# Patient Record
Sex: MALE | Race: OTHER | Employment: FULL TIME | URBAN - METROPOLITAN AREA
[De-identification: names, ages, dates, MRNs, and addresses within clinical notes are randomized per-mention and may not be internally consistent; named-entity substitution may affect disease eponyms.]

---

## 2020-12-10 RX ORDER — AMLODIPINE BESYLATE 10 MG/1
TABLET ORAL
Qty: 90 TAB | Refills: 0 | Status: SHIPPED | OUTPATIENT
Start: 2020-12-10 | End: 2021-05-07

## 2021-01-22 VITALS
WEIGHT: 205.38 LBS | DIASTOLIC BLOOD PRESSURE: 75 MMHG | HEIGHT: 69 IN | RESPIRATION RATE: 18 BRPM | SYSTOLIC BLOOD PRESSURE: 120 MMHG | BODY MASS INDEX: 30.42 KG/M2 | HEART RATE: 78 BPM | OXYGEN SATURATION: 98 % | TEMPERATURE: 98.7 F

## 2021-01-22 PROBLEM — I10 ESSENTIAL HYPERTENSION: Status: ACTIVE | Noted: 2021-01-22

## 2021-01-22 PROBLEM — E78.1 HYPERTRIGLYCERIDEMIA: Status: ACTIVE | Noted: 2021-01-22

## 2021-01-22 PROBLEM — E55.9 VITAMIN D DEFICIENCY: Status: ACTIVE | Noted: 2021-01-22

## 2021-01-22 PROBLEM — G47.33 OBSTRUCTIVE SLEEP APNEA SYNDROME: Status: ACTIVE | Noted: 2021-01-22

## 2021-01-22 PROBLEM — E84.8 ELEVATED LIVER ENZYMES LEVEL DUE TO CYSTIC FIBROSIS (HCC): Status: ACTIVE | Noted: 2021-01-22

## 2021-01-22 PROBLEM — R74.8 ELEVATED LIVER ENZYMES LEVEL DUE TO CYSTIC FIBROSIS (HCC): Status: ACTIVE | Noted: 2021-01-22

## 2021-01-22 PROBLEM — N52.9 PRIMARY ERECTILE DYSFUNCTION: Status: ACTIVE | Noted: 2021-01-22

## 2021-01-22 PROBLEM — R53.83 FATIGUE: Status: ACTIVE | Noted: 2021-01-22

## 2021-01-22 PROBLEM — R68.82 REDUCED LIBIDO: Status: ACTIVE | Noted: 2021-01-22

## 2021-01-22 RX ORDER — ATORVASTATIN CALCIUM 40 MG/1
TABLET, FILM COATED ORAL DAILY
COMMUNITY
End: 2021-05-14 | Stop reason: SINTOL

## 2021-01-22 RX ORDER — CHOLECALCIFEROL (VITAMIN D3) 125 MCG
CAPSULE ORAL
COMMUNITY
End: 2021-05-14 | Stop reason: ALTCHOICE

## 2021-01-22 RX ORDER — FENOFIBRATE 145 MG/1
TABLET, COATED ORAL DAILY
COMMUNITY
End: 2021-05-14 | Stop reason: SDUPTHER

## 2021-04-16 ENCOUNTER — OFFICE VISIT (OUTPATIENT)
Dept: FAMILY MEDICINE CLINIC | Age: 56
End: 2021-04-16
Payer: COMMERCIAL

## 2021-04-16 VITALS
HEART RATE: 79 BPM | HEIGHT: 69 IN | DIASTOLIC BLOOD PRESSURE: 66 MMHG | BODY MASS INDEX: 29.77 KG/M2 | SYSTOLIC BLOOD PRESSURE: 121 MMHG | OXYGEN SATURATION: 96 % | WEIGHT: 201 LBS | TEMPERATURE: 98.6 F | RESPIRATION RATE: 18 BRPM

## 2021-04-16 DIAGNOSIS — Z11.59 ENCOUNTER FOR HEPATITIS C SCREENING TEST FOR LOW RISK PATIENT: ICD-10-CM

## 2021-04-16 DIAGNOSIS — E78.5 HYPERLIPIDEMIA, UNSPECIFIED HYPERLIPIDEMIA TYPE: ICD-10-CM

## 2021-04-16 DIAGNOSIS — Z00.00 ANNUAL PHYSICAL EXAM: ICD-10-CM

## 2021-04-16 DIAGNOSIS — Z12.11 COLON CANCER SCREENING: Primary | ICD-10-CM

## 2021-04-16 DIAGNOSIS — M77.8 TENDINITIS OF BOTH ELBOWS: ICD-10-CM

## 2021-04-16 DIAGNOSIS — M17.10 ARTHRITIS OF KNEE: ICD-10-CM

## 2021-04-16 DIAGNOSIS — I10 HYPERTENSION, ESSENTIAL: ICD-10-CM

## 2021-04-16 PROCEDURE — 99396 PREV VISIT EST AGE 40-64: CPT | Performed by: NURSE PRACTITIONER

## 2021-04-16 RX ORDER — DICLOFENAC SODIUM 75 MG/1
75 TABLET, DELAYED RELEASE ORAL 2 TIMES DAILY
Qty: 60 TAB | Refills: 0 | Status: SHIPPED | OUTPATIENT
Start: 2021-04-16 | End: 2021-05-14 | Stop reason: SDUPTHER

## 2021-04-16 NOTE — PROGRESS NOTES
Porter Gruber (: 1965) is a 54 y.o. male, established patient, here for evaluation of the following chief complaint(s):  Elbow Pain, Knee Pain, and Complete Physical       ASSESSMENT/PLAN:  1. Colon cancer screening  -     REFERRAL TO GASTROENTEROLOGY  2. Encounter for hepatitis C screening test for low risk patient  -     HEPATITIS C QT BY PCR WITH REFLEX GENOTYPE  3. Annual physical exam  -     CBC WITH AUTOMATED DIFF  -     METABOLIC PANEL, COMPREHENSIVE  -     LIPID PANEL  -     THYROID CASCADE PROFILE  -     PSA W/ REFLX FREE PSA  4. Hyperlipidemia, unspecified hyperlipidemia type  -     CBC WITH AUTOMATED DIFF  -     METABOLIC PANEL, COMPREHENSIVE  -     LIPID PANEL  -     THYROID CASCADE PROFILE  5. Hypertension, essential  -     CBC WITH AUTOMATED DIFF  -     METABOLIC PANEL, COMPREHENSIVE  -     LIPID PANEL  -     THYROID CASCADE PROFILE  -     MICROALBUMIN, UR, RAND W/ MICROALB/CREAT RATIO  6. Tendinitis of both elbows  -     diclofenac EC (VOLTAREN) 75 mg EC tablet; Take 1 Tab by mouth two (2) times a day. Indications: joint damage causing pain and loss of function, Normal, Disp-60 Tab, R-0  7. Arthritis of knee  -     diclofenac EC (VOLTAREN) 75 mg EC tablet; Take 1 Tab by mouth two (2) times a day. Indications: joint damage causing pain and loss of function, Normal, Disp-60 Tab, R-0  At this time patient due for labs to further evaluate his chronic conditions    For the elbow and the knees we will start with Voltaren orally twice a day as needed for pain    We will not do any other changes until his follow-up    Return in about 4 weeks (around 2021) for Lab review, Hyperlipidemia, HTN, knee pain, elbow arleth n diclofenac better? .      SUBJECTIVE/OBJECTIVE:  HPI  Patient presenting for hypertension followup, annual exam, hyperlipidemia check.  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 Patient has symptoms of none of the following; no chest pain, shortness of breath, weakness, orthostatic hypotension, cough, myalgias, rash, headaches, weight gain, leg swelling, palpitations, slow heart rate, fatigue, depression. Patient reports good compliance with medications, no side effects from medications noted. Current treatments include diet modification, weight loss. Review of Systems   All other systems reviewed and are negative. Visit Vitals  /66 (BP 1 Location: Left arm, BP Patient Position: Sitting, BP Cuff Size: Adult)   Pulse 79   Temp 98.6 °F (37 °C) (Temporal)   Resp 18   Ht 5' 9\" (1.753 m)   Wt 201 lb (91.2 kg)   SpO2 96%   BMI 29.68 kg/m²         Physical Exam  Constitutional:  No acute distress  HEENT:  Head normocephalic and atraumatic. CV:  Regular rate and rhythm. No murmur. Respiratory:  Lungs clear to auscultation bilaterally  Abdomen:  Soft, non-tender. Skin:  Normal color. Warm and Dry  Extremities:  Non-tender. No pedal edema. Back:  No tenderness  Neuro:  No gross motor deficits      An electronic signature was used to authenticate this note.   -- Yanique Alvarez NP

## 2021-04-16 NOTE — PROGRESS NOTES
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 Have you been to the ER, urgent care clinic since your last visit? Hospitalized since your last visit? No    2. Have you seen or consulted any other health care providers outside of the [address] [place] [address] since your last visit? Include any pap smears or colon screening.  No     Visit Vitals  /66 (BP 1 Location: Left arm, BP Patient Position: Sitting, BP Cuff Size: Adult)   Pulse 79   Temp 98.6 °F (37 °C) (Temporal)   Resp 18   Ht 5' 9\" (1.753 m)   Wt 201 lb (91.2 kg)   SpO2 96%   BMI 29.68 kg/m²

## 2021-04-19 LAB
ALBUMIN SERPL-MCNC: 4.4 G/DL (ref 3.8–4.9)
ALBUMIN/CREAT UR: 5 MG/G CREAT (ref 0–29)
ALBUMIN/GLOB SERPL: 1.7 {RATIO} (ref 1.2–2.2)
ALP SERPL-CCNC: 111 IU/L (ref 39–117)
ALT SERPL-CCNC: 31 IU/L (ref 0–44)
AST SERPL-CCNC: 24 IU/L (ref 0–40)
BASOPHILS # BLD AUTO: 0.1 X10E3/UL (ref 0–0.2)
BASOPHILS NFR BLD AUTO: 1 %
BILIRUB SERPL-MCNC: 0.4 MG/DL (ref 0–1.2)
BUN SERPL-MCNC: 12 MG/DL (ref 6–24)
BUN/CREAT SERPL: 16 (ref 9–20)
CALCIUM SERPL-MCNC: 8.9 MG/DL (ref 8.7–10.2)
CHLORIDE SERPL-SCNC: 105 MMOL/L (ref 96–106)
CHOLEST SERPL-MCNC: 236 MG/DL (ref 100–199)
CO2 SERPL-SCNC: 21 MMOL/L (ref 20–29)
CREAT SERPL-MCNC: 0.76 MG/DL (ref 0.76–1.27)
CREAT UR-MCNC: 164.9 MG/DL
EOSINOPHIL # BLD AUTO: 0.1 X10E3/UL (ref 0–0.4)
EOSINOPHIL NFR BLD AUTO: 1 %
ERYTHROCYTE [DISTWIDTH] IN BLOOD BY AUTOMATED COUNT: 13.4 % (ref 11.6–15.4)
GLOBULIN SER CALC-MCNC: 2.6 G/DL (ref 1.5–4.5)
GLUCOSE SERPL-MCNC: 94 MG/DL (ref 65–99)
HCT VFR BLD AUTO: 44.2 % (ref 37.5–51)
HCV GENOTYPE: NORMAL
HCV RNA SERPL NAA+PROBE-ACNC: NORMAL IU/ML
HCV RNA SERPL NAA+PROBE-LOG IU: NORMAL LOG10 IU/ML
HDLC SERPL-MCNC: 41 MG/DL
HGB BLD-MCNC: 14.9 G/DL (ref 13–17.7)
IMM GRANULOCYTES # BLD AUTO: 0 X10E3/UL (ref 0–0.1)
IMM GRANULOCYTES NFR BLD AUTO: 0 %
LDLC SERPL CALC-MCNC: 151 MG/DL (ref 0–99)
LYMPHOCYTES # BLD AUTO: 2.1 X10E3/UL (ref 0.7–3.1)
LYMPHOCYTES NFR BLD AUTO: 33 %
MCH RBC QN AUTO: 30.7 PG (ref 26.6–33)
MCHC RBC AUTO-ENTMCNC: 33.7 G/DL (ref 31.5–35.7)
MCV RBC AUTO: 91 FL (ref 79–97)
MICROALBUMIN UR-MCNC: 8.7 UG/ML
MONOCYTES # BLD AUTO: 0.5 X10E3/UL (ref 0.1–0.9)
MONOCYTES NFR BLD AUTO: 8 %
NEUTROPHILS # BLD AUTO: 3.5 X10E3/UL (ref 1.4–7)
NEUTROPHILS NFR BLD AUTO: 57 %
PLATELET # BLD AUTO: 307 X10E3/UL (ref 150–450)
POTASSIUM SERPL-SCNC: 4 MMOL/L (ref 3.5–5.2)
PROT SERPL-MCNC: 7 G/DL (ref 6–8.5)
PSA SERPL-MCNC: 1 NG/ML (ref 0–4)
RBC # BLD AUTO: 4.86 X10E6/UL (ref 4.14–5.8)
REFLEX CRITERIA: NORMAL
SODIUM SERPL-SCNC: 142 MMOL/L (ref 134–144)
TEST INFORMATION: NORMAL
TRIGL SERPL-MCNC: 237 MG/DL (ref 0–149)
TSH SERPL DL<=0.005 MIU/L-ACNC: 0.94 UIU/ML (ref 0.45–4.5)
VLDLC SERPL CALC-MCNC: 44 MG/DL (ref 5–40)
WBC # BLD AUTO: 6.2 X10E3/UL (ref 3.4–10.8)

## 2021-04-27 ENCOUNTER — TELEPHONE (OUTPATIENT)
Dept: FAMILY MEDICINE CLINIC | Age: 56
End: 2021-04-27

## 2021-05-07 RX ORDER — AMLODIPINE BESYLATE 10 MG/1
TABLET ORAL
Qty: 90 TAB | Refills: 6 | Status: SHIPPED | OUTPATIENT
Start: 2021-05-07 | End: 2021-05-14 | Stop reason: SDUPTHER

## 2021-05-14 ENCOUNTER — OFFICE VISIT (OUTPATIENT)
Dept: FAMILY MEDICINE CLINIC | Age: 56
End: 2021-05-14
Payer: COMMERCIAL

## 2021-05-14 VITALS
HEART RATE: 77 BPM | DIASTOLIC BLOOD PRESSURE: 86 MMHG | RESPIRATION RATE: 16 BRPM | TEMPERATURE: 98.7 F | BODY MASS INDEX: 29.33 KG/M2 | HEIGHT: 69 IN | SYSTOLIC BLOOD PRESSURE: 131 MMHG | WEIGHT: 198 LBS | OXYGEN SATURATION: 98 %

## 2021-05-14 DIAGNOSIS — R53.82 CHRONIC FATIGUE: ICD-10-CM

## 2021-05-14 DIAGNOSIS — M17.10 ARTHRITIS OF KNEE: ICD-10-CM

## 2021-05-14 DIAGNOSIS — I10 ESSENTIAL HYPERTENSION: ICD-10-CM

## 2021-05-14 DIAGNOSIS — M77.8 TENDINITIS OF BOTH ELBOWS: ICD-10-CM

## 2021-05-14 DIAGNOSIS — E78.1 HYPERTRIGLYCERIDEMIA: Primary | ICD-10-CM

## 2021-05-14 DIAGNOSIS — R68.82 REDUCED LIBIDO: ICD-10-CM

## 2021-05-14 DIAGNOSIS — E55.9 VITAMIN D DEFICIENCY: ICD-10-CM

## 2021-05-14 DIAGNOSIS — E78.00 PURE HYPERCHOLESTEROLEMIA: ICD-10-CM

## 2021-05-14 PROCEDURE — 99214 OFFICE O/P EST MOD 30 MIN: CPT | Performed by: NURSE PRACTITIONER

## 2021-05-14 RX ORDER — TADALAFIL 5 MG/1
5 TABLET ORAL
Qty: 30 TAB | Refills: 0 | Status: SHIPPED | OUTPATIENT
Start: 2021-05-14 | End: 2021-08-20 | Stop reason: SDUPTHER

## 2021-05-14 RX ORDER — EZETIMIBE 10 MG/1
10 TABLET ORAL DAILY
Qty: 90 TAB | Refills: 0 | Status: SHIPPED
Start: 2021-05-14 | End: 2021-08-20 | Stop reason: SINTOL

## 2021-05-14 RX ORDER — FENOFIBRATE 145 MG/1
145 TABLET, COATED ORAL
Qty: 90 TAB | Refills: 0 | Status: SHIPPED
Start: 2021-05-14 | End: 2021-08-20 | Stop reason: SINTOL

## 2021-05-14 RX ORDER — AMLODIPINE BESYLATE 10 MG/1
TABLET ORAL
Qty: 90 TAB | Refills: 6 | Status: SHIPPED | OUTPATIENT
Start: 2021-05-14 | End: 2021-08-20 | Stop reason: SDUPTHER

## 2021-05-14 RX ORDER — DICLOFENAC SODIUM 75 MG/1
75 TABLET, DELAYED RELEASE ORAL 2 TIMES DAILY
Qty: 60 TAB | Refills: 0 | Status: SHIPPED | OUTPATIENT
Start: 2021-05-14 | End: 2021-07-25

## 2021-05-14 NOTE — PROGRESS NOTES
Alfredo Rodriguez (: 1965) is a 54 y.o. male, established patient, here for evaluation of the following chief complaint(s):  Follow-up (Lab review, Hyperlipidemia, HTN, knee pain, elbow pain diclofenac better? . NO known injurie. Patient does construction work.  ), Labs, Cholesterol Problem, Hypertension, Knee Pain (Bilateral knee pain ), and Arm Pain (Bilateral elbow pain )       ASSESSMENT/PLAN:  Below is the assessment and plan developed based on review of pertinent history, physical exam, labs, studies, and medications. 1. Hypertriglyceridemia  -     fenofibrate nanocrystallized (TRICOR) 145 mg tablet; Take 1 Tab by mouth Daily (before dinner). Indications: high amount of triglyceride in the blood, Normal, Disp-90 Tab, R-0  -     CBC WITH AUTOMATED DIFF  -     METABOLIC PANEL, COMPREHENSIVE  -     LIPID PANEL  2. Pure hypercholesterolemia  -     ezetimibe (ZETIA) 10 mg tablet; Take 1 Tab by mouth daily. Indications: high cholesterol and high triglycerides, Normal, Disp-90 Tab, R-0  -     CBC WITH AUTOMATED DIFF  -     METABOLIC PANEL, COMPREHENSIVE  -     LIPID PANEL  3. Chronic fatigue  -     tadalafiL (Cialis) 5 mg tablet; Take 1 Tab by mouth daily as needed for Other (fatigue). Indications: fatigue, Normal, Disp-30 Tab, R-0  4. Essential hypertension  -     amLODIPine (NORVASC) 10 mg tablet; TAKE 1 TABLET BY MOUTH DAILY  Indications: high blood pressure, Normal, Disp-90 Tab, R-6  -     CBC WITH AUTOMATED DIFF  -     METABOLIC PANEL, COMPREHENSIVE  -     LIPID PANEL  5. Tendinitis of both elbows  -     diclofenac EC (VOLTAREN) 75 mg EC tablet; Take 1 Tab by mouth two (2) times a day. Indications: joint damage causing pain and loss of function, Normal, Disp-60 Tab, R-0  6. Arthritis of knee  -     diclofenac EC (VOLTAREN) 75 mg EC tablet; Take 1 Tab by mouth two (2) times a day. Indications: joint damage causing pain and loss of function, Normal, Disp-60 Tab, R-0  7.  Reduced libido  -     tadalafiL (Cialis) 5 mg tablet; Take 1 Tab by mouth daily as needed for Other (fatigue). Indications: fatigue, Normal, Disp-30 Tab, R-0  8. Vitamin D deficiency  -     VITAMIN D, 25 HYDROXY     Recent labs showed elevated triglycerides in cholesterol for which we will start fenofibrate and Zetia    All other results are within normal limits no other changes at this time    Return in about 3 months (around 8/14/2021) for Hyperlipidemia new fenofibrate and zetia. , Lab review. SUBJECTIVE/OBJECTIVE:  HPI  Patient presenting for hypertension followup, hyperlipidemia check, chronic pain. Patient reports blood pressures at home most frequently not checked. Patient has symptoms of none of the following; no chest pain, shortness of breath, weakness, orthostatic hypotension, cough, myalgias, rash, headaches, weight gain, leg swelling, palpitations, slow heart rate, fatigue, depression. Patient reports good compliance with medications, no side effects from medications noted. Current treatments include diet modification, weight loss. Patient reports arthritis of the knee and bilateral elbows well controlled with Voltaren 75 mg and needing refill at this point    Patient also with chronic history of reduced libido for which previously had taken Cialis 5 mg with good management of his symptoms    Review of Systems   All other systems reviewed and are negative. Visit Vitals  /86 (BP 1 Location: Right arm, BP Patient Position: Sitting, BP Cuff Size: Adult)   Pulse 77   Temp 98.7 °F (37.1 °C) (Temporal)   Resp 16   Ht 5' 9\" (1.753 m)   Wt 198 lb (89.8 kg)   SpO2 98%   BMI 29.24 kg/m²     Physical Exam  Constitutional:  No acute distress  HEENT:  Head normocephalic and atraumatic. CV:  Regular rate and rhythm. No murmur. Respiratory:  Lungs clear to auscultation bilaterally  Abdomen:  Soft, non-tender. Skin:  Normal color. Warm and Dry  Extremities:  Non-tender. No pedal edema.    Back:  No tenderness  Neuro:  No gross motor deficits    On this date 05/14/2021 I have spent 31 minutes reviewing previous notes, test results and face to face with the patient discussing the diagnosis and importance of compliance with the treatment plan as well as documenting on the day of the visit. An electronic signature was used to authenticate this note.   -- Randy Blue NP

## 2021-05-14 NOTE — PROGRESS NOTES
Chief Complaint   Patient presents with    Follow-up     Lab review, Hyperlipidemia, HTN, knee pain, elbow pain diclofenac better? . NO known injurie. Patient does construction work.  Labs    Cholesterol Problem    Hypertension    Knee Pain     Bilateral knee pain     Arm Pain     Bilateral elbow pain      1. Have you been to the ER, urgent care clinic since your last visit? Hospitalized since your last visit? No    2. Have you seen or consulted any other health care providers outside of the [address] [place] [address] since your last visit? Include any pap smears or colon screening.  No     Visit Vitals  /86 (BP 1 Location: Right arm, BP Patient Position: Sitting, BP Cuff Size: Adult)   Pulse 77   Temp 98.7 °F (37.1 °C) (Temporal)   Resp 16   Ht 5' 9\" (1.753 m)   Wt 198 lb (89.8 kg)   SpO2 98%   BMI 29.24 kg/m²

## 2021-05-20 NOTE — PROGRESS NOTES
This not has been reviewed. I attest that I was available for consultation via telephone, Epic or in person. I will continue to follow this patient with Dr Ana Kirk.    Benigno Wu, DO

## 2021-07-22 DIAGNOSIS — M77.8 TENDINITIS OF BOTH ELBOWS: ICD-10-CM

## 2021-07-22 DIAGNOSIS — M17.10 ARTHRITIS OF KNEE: ICD-10-CM

## 2021-07-25 RX ORDER — DICLOFENAC SODIUM 75 MG/1
TABLET, DELAYED RELEASE ORAL
Qty: 60 TABLET | Refills: 0 | Status: SHIPPED | OUTPATIENT
Start: 2021-07-25 | End: 2021-08-20 | Stop reason: SDUPTHER

## 2021-08-18 LAB
25(OH)D3+25(OH)D2 SERPL-MCNC: 15.7 NG/ML (ref 30–100)
ALBUMIN SERPL-MCNC: 4.5 G/DL (ref 3.8–4.9)
ALBUMIN/GLOB SERPL: 1.8 {RATIO} (ref 1.2–2.2)
ALP SERPL-CCNC: 110 IU/L (ref 48–121)
ALT SERPL-CCNC: 34 IU/L (ref 0–44)
AST SERPL-CCNC: 19 IU/L (ref 0–40)
BASOPHILS # BLD AUTO: 0.1 X10E3/UL (ref 0–0.2)
BASOPHILS NFR BLD AUTO: 1 %
BILIRUB SERPL-MCNC: 0.4 MG/DL (ref 0–1.2)
BUN SERPL-MCNC: 14 MG/DL (ref 6–24)
BUN/CREAT SERPL: 19 (ref 9–20)
CALCIUM SERPL-MCNC: 9.3 MG/DL (ref 8.7–10.2)
CHLORIDE SERPL-SCNC: 102 MMOL/L (ref 96–106)
CHOLEST SERPL-MCNC: 251 MG/DL (ref 100–199)
CO2 SERPL-SCNC: 21 MMOL/L (ref 20–29)
CREAT SERPL-MCNC: 0.73 MG/DL (ref 0.76–1.27)
EOSINOPHIL # BLD AUTO: 0.1 X10E3/UL (ref 0–0.4)
EOSINOPHIL NFR BLD AUTO: 1 %
ERYTHROCYTE [DISTWIDTH] IN BLOOD BY AUTOMATED COUNT: 13.6 % (ref 11.6–15.4)
GLOBULIN SER CALC-MCNC: 2.5 G/DL (ref 1.5–4.5)
GLUCOSE SERPL-MCNC: 113 MG/DL (ref 65–99)
HCT VFR BLD AUTO: 42.7 % (ref 37.5–51)
HDLC SERPL-MCNC: 40 MG/DL
HGB BLD-MCNC: 14.5 G/DL (ref 13–17.7)
IMM GRANULOCYTES # BLD AUTO: 0 X10E3/UL (ref 0–0.1)
IMM GRANULOCYTES NFR BLD AUTO: 0 %
LDLC SERPL CALC-MCNC: 133 MG/DL (ref 0–99)
LYMPHOCYTES # BLD AUTO: 2.5 X10E3/UL (ref 0.7–3.1)
LYMPHOCYTES NFR BLD AUTO: 34 %
MCH RBC QN AUTO: 30.8 PG (ref 26.6–33)
MCHC RBC AUTO-ENTMCNC: 34 G/DL (ref 31.5–35.7)
MCV RBC AUTO: 91 FL (ref 79–97)
MONOCYTES # BLD AUTO: 0.6 X10E3/UL (ref 0.1–0.9)
MONOCYTES NFR BLD AUTO: 8 %
NEUTROPHILS # BLD AUTO: 4.2 X10E3/UL (ref 1.4–7)
NEUTROPHILS NFR BLD AUTO: 56 %
PLATELET # BLD AUTO: 286 X10E3/UL (ref 150–450)
POTASSIUM SERPL-SCNC: 4.1 MMOL/L (ref 3.5–5.2)
PROT SERPL-MCNC: 7 G/DL (ref 6–8.5)
RBC # BLD AUTO: 4.71 X10E6/UL (ref 4.14–5.8)
SODIUM SERPL-SCNC: 141 MMOL/L (ref 134–144)
TRIGL SERPL-MCNC: 429 MG/DL (ref 0–149)
VLDLC SERPL CALC-MCNC: 78 MG/DL (ref 5–40)
WBC # BLD AUTO: 7.4 X10E3/UL (ref 3.4–10.8)

## 2021-08-20 ENCOUNTER — OFFICE VISIT (OUTPATIENT)
Dept: FAMILY MEDICINE CLINIC | Age: 56
End: 2021-08-20
Payer: COMMERCIAL

## 2021-08-20 VITALS
WEIGHT: 207 LBS | HEIGHT: 68 IN | SYSTOLIC BLOOD PRESSURE: 122 MMHG | HEART RATE: 72 BPM | DIASTOLIC BLOOD PRESSURE: 77 MMHG | OXYGEN SATURATION: 97 % | TEMPERATURE: 98.7 F | BODY MASS INDEX: 31.37 KG/M2 | RESPIRATION RATE: 18 BRPM

## 2021-08-20 DIAGNOSIS — Z23 ENCOUNTER FOR IMMUNIZATION: ICD-10-CM

## 2021-08-20 DIAGNOSIS — R51.9 ACUTE INTRACTABLE HEADACHE, UNSPECIFIED HEADACHE TYPE: ICD-10-CM

## 2021-08-20 DIAGNOSIS — H53.8 BLURRY VISION, LEFT EYE: ICD-10-CM

## 2021-08-20 DIAGNOSIS — R53.83 FATIGUE, UNSPECIFIED TYPE: ICD-10-CM

## 2021-08-20 DIAGNOSIS — Z12.11 SCREEN FOR COLON CANCER: ICD-10-CM

## 2021-08-20 DIAGNOSIS — R68.82 REDUCED LIBIDO: ICD-10-CM

## 2021-08-20 DIAGNOSIS — R53.82 CHRONIC FATIGUE: ICD-10-CM

## 2021-08-20 DIAGNOSIS — R73.03 PREDIABETES: ICD-10-CM

## 2021-08-20 DIAGNOSIS — E84.8 ELEVATED LIVER ENZYMES LEVEL DUE TO CYSTIC FIBROSIS (HCC): ICD-10-CM

## 2021-08-20 DIAGNOSIS — E55.9 VITAMIN D DEFICIENCY: ICD-10-CM

## 2021-08-20 DIAGNOSIS — M17.10 ARTHRITIS OF KNEE: ICD-10-CM

## 2021-08-20 DIAGNOSIS — M77.8 TENDINITIS OF BOTH ELBOWS: ICD-10-CM

## 2021-08-20 DIAGNOSIS — I10 ESSENTIAL HYPERTENSION: Primary | ICD-10-CM

## 2021-08-20 DIAGNOSIS — E78.1 HYPERTRIGLYCERIDEMIA: ICD-10-CM

## 2021-08-20 DIAGNOSIS — R74.8 ELEVATED LIVER ENZYMES LEVEL DUE TO CYSTIC FIBROSIS (HCC): ICD-10-CM

## 2021-08-20 DIAGNOSIS — N52.9 PRIMARY ERECTILE DYSFUNCTION: ICD-10-CM

## 2021-08-20 PROCEDURE — 99214 OFFICE O/P EST MOD 30 MIN: CPT | Performed by: NURSE PRACTITIONER

## 2021-08-20 RX ORDER — OMEGA-3-ACID ETHYL ESTERS 1 G/1
2 CAPSULE, LIQUID FILLED ORAL 2 TIMES DAILY WITH MEALS
Qty: 360 CAPSULE | Refills: 0 | Status: SHIPPED | OUTPATIENT
Start: 2021-08-20 | End: 2021-11-19 | Stop reason: SDUPTHER

## 2021-08-20 RX ORDER — AMLODIPINE BESYLATE 10 MG/1
TABLET ORAL
Qty: 90 TABLET | Refills: 6 | Status: SHIPPED | OUTPATIENT
Start: 2021-08-20 | End: 2021-10-03

## 2021-08-20 RX ORDER — DICLOFENAC SODIUM 75 MG/1
TABLET, DELAYED RELEASE ORAL
Qty: 60 TABLET | Refills: 2 | Status: SHIPPED | OUTPATIENT
Start: 2021-08-20 | End: 2021-11-19 | Stop reason: SDUPTHER

## 2021-08-20 RX ORDER — ZOSTER VACCINE RECOMBINANT, ADJUVANTED 50 MCG/0.5
0.5 KIT INTRAMUSCULAR ONCE
Qty: 0.5 ML | Refills: 1 | Status: SHIPPED | OUTPATIENT
Start: 2021-08-20 | End: 2021-08-20

## 2021-08-20 RX ORDER — ERGOCALCIFEROL 1.25 MG/1
50000 CAPSULE ORAL
Qty: 12 CAPSULE | Refills: 0 | Status: SHIPPED | OUTPATIENT
Start: 2021-08-20 | End: 2021-11-19 | Stop reason: SDUPTHER

## 2021-08-20 RX ORDER — TADALAFIL 5 MG/1
5 TABLET ORAL
Qty: 30 TABLET | Refills: 0 | Status: SHIPPED | OUTPATIENT
Start: 2021-08-20 | End: 2021-11-19 | Stop reason: ALTCHOICE

## 2021-08-20 NOTE — PATIENT INSTRUCTIONS
La proxima vacuna the [employer] 2021    La vacuna del Flu en Septiembre 2021    Empieze Vitamina D 1 vez a la semana    Empieze Lovaza 2 capsulas 2 veces al sandy para el colesterol    Vaya por el neurologo para evaluar los ted de solange y la vision borrosa    Saquese la bakari 2-3 pruett antes de eaton proxima visita    Regrese en 3 meses

## 2021-08-20 NOTE — PROGRESS NOTES
Tex Penny (: 1965) is a 64 y.o. male, established patient, here for evaluation of the following chief complaint(s):  Cholesterol Problem, Hypertension, Medication Evaluation, and Medication Refill         ASSESSMENT/PLAN:  Below is the assessment and plan developed based on review of pertinent history, physical exam, labs, studies, and medications. 1. Essential hypertension  -     CBC WITH AUTOMATED DIFF  -     METABOLIC PANEL, COMPREHENSIVE  -     LIPID PANEL  -     MICROALBUMIN, UR, RAND W/ MICROALB/CREAT RATIO  -     amLODIPine (NORVASC) 10 mg tablet; TAKE 1 TABLET BY MOUTH DAILY  Indications: high blood pressure, Normal, Disp-90 Tablet, R-6  2. Elevated liver enzymes level due to cystic fibrosis (HCC)  -     CBC WITH AUTOMATED DIFF  -     METABOLIC PANEL, COMPREHENSIVE  3. Vitamin D deficiency  -     VITAMIN D, 25 HYDROXY  -     ergocalciferol (ERGOCALCIFEROL) 1,250 mcg (50,000 unit) capsule; Take 1 Capsule by mouth every seven (7) days. Indications: vitamin D deficiency (high dose therapy), Normal, Disp-12 Capsule, R-0  4. Hypertriglyceridemia  -     LIPID PANEL  -     omega-3 acid ethyl esters (LOVAZA) 1 gram capsule; Take 2 Capsules by mouth two (2) times daily (with meals). Indications: high amount of triglyceride in the blood, Normal, Disp-360 Capsule, R-0  5. Fatigue, unspecified type  6. Primary erectile dysfunction  7. Prediabetes  -     HEMOGLOBIN A1C WITH EAG  8. Screen for colon cancer  -     REFERRAL FOR COLONOSCOPY  9. Encounter for immunization  -     varicella-zoster recombinant, PF, (Shingrix, PF,) 50 mcg/0.5 mL susr injection; 0.5 mL by IntraMUSCular route once for 1 dose. Indications: shingles vaccination, Normal, Disp-0.5 mL, R-1  10.  Blurry vision, left eye  -     REFERRAL TO NEUROLOGY  11. Acute intractable headache, unspecified headache type  -     REFERRAL TO NEUROLOGY  12. Tendinitis of both elbows  -     diclofenac EC (VOLTAREN) 75 mg EC tablet; TAKE 1 TABLET BY MOUTH TWO TIMES A DAY, Normal, Disp-60 Tablet, R-2  13. Arthritis of knee  -     diclofenac EC (VOLTAREN) 75 mg EC tablet; TAKE 1 TABLET BY MOUTH TWO TIMES A DAY, Normal, Disp-60 Tablet, R-2  14. Chronic fatigue  -     tadalafiL (Cialis) 5 mg tablet; Take 1 Tablet by mouth daily as needed for Other (fatigue). Indications: fatigue, Normal, Disp-30 Tablet, R-0  15. Reduced libido  -     tadalafiL (Cialis) 5 mg tablet; Take 1 Tablet by mouth daily as needed for Other (fatigue). Indications: fatigue, Normal, Disp-30 Tablet, R-0  elevated cholesterol at this time we will add Lovaza to be taking 2 g twice a day with meals    We will also add fenofibrate at a higher dose 160 mg     We will continue with all the medications as directed    Vitamin d very low, will also start Vitamin D     Will refer to neurology to evaluate headache with blurry vision    Return in about 3 months (around 11/20/2021) for HTN, Hyperlipidemia, Lovaza, post neuro HA L eye blurry vision. SUBJECTIVE/OBJECTIVE:  HPI  About 3 week ago had an episode in which he had blurry vision while at dinner, wife checked glucose and bp, reports normal but did not state levels. Yesterday driving started blurry vision of the left eye for several minutes that resolved by itself. No headache,   Does report new onset of headache for the past couple months, takes OTC NASAIDs or Tylenol and it helps, localized occipital, pressure like feeling, causes tenderness to touch, last eye exam 7 years ago was normal. No dizziness, confusion, LOC, vertigo. Have been taking BP meds as directed. Review of Systems  All other systems reviewed and are negative.   Visit Vitals  /77 (BP 1 Location: Left upper arm, BP Patient Position: Sitting, BP Cuff Size: Adult)   Pulse 72   Temp 98.7 °F (37.1 °C) (Temporal)   Resp 18   Ht 5' 8\" (1.727 m)   Wt 207 lb (93.9 kg)   SpO2 97%   BMI 31.47 kg/m²       Physical Exam  Constitutional:  No acute distress  HEENT:  Head normocephalic and atraumatic. CV:  Regular rate and rhythm. No murmur. Respiratory:  Lungs clear to auscultation bilaterally  Abdomen:  Soft, non-tender. Skin:  Normal color. Warm and Dry  Extremities:  Non-tender. No pedal edema. Back:  No tenderness  Neuro:  No gross motor deficits    On this date 08/20/2021 I have spent 31 minutes reviewing previous notes, test results and face to face with the patient discussing the diagnosis and importance of compliance with the treatment plan as well as documenting on the day of the visit. Aspects of this note may have been generated using voice recognition software. Despite editing, there may be some syntax errors. An electronic signature was used to authenticate this note.   -- Abi Colon NP

## 2021-09-27 ENCOUNTER — TELEPHONE (OUTPATIENT)
Dept: FAMILY MEDICINE CLINIC | Age: 56
End: 2021-09-27

## 2021-09-27 NOTE — TELEPHONE ENCOUNTER
I spoke with the patient's wife and she advised that the patient has colonoscopy scheduled for 11/8/21 with Dr. Valentín Singleton. She stated that the patient is still noticing bright red blood with every bowel movement, she also stated that she was not opposed to having the patient see another provider if they would be able to get him scheduled sooner.

## 2021-09-29 DIAGNOSIS — I10 ESSENTIAL HYPERTENSION: ICD-10-CM

## 2021-10-03 RX ORDER — AMLODIPINE BESYLATE 10 MG/1
TABLET ORAL
Qty: 90 TABLET | Refills: 6 | Status: SHIPPED | OUTPATIENT
Start: 2021-10-03 | End: 2021-11-19 | Stop reason: SDUPTHER

## 2021-11-19 ENCOUNTER — OFFICE VISIT (OUTPATIENT)
Dept: FAMILY MEDICINE CLINIC | Age: 56
End: 2021-11-19
Payer: COMMERCIAL

## 2021-11-19 VITALS
DIASTOLIC BLOOD PRESSURE: 82 MMHG | WEIGHT: 214.2 LBS | SYSTOLIC BLOOD PRESSURE: 139 MMHG | BODY MASS INDEX: 32.57 KG/M2 | OXYGEN SATURATION: 97 % | RESPIRATION RATE: 18 BRPM | HEART RATE: 78 BPM | TEMPERATURE: 97.5 F

## 2021-11-19 DIAGNOSIS — M17.10 ARTHRITIS OF KNEE: ICD-10-CM

## 2021-11-19 DIAGNOSIS — Z23 ENCOUNTER FOR IMMUNIZATION: Primary | ICD-10-CM

## 2021-11-19 DIAGNOSIS — I10 ESSENTIAL HYPERTENSION: ICD-10-CM

## 2021-11-19 DIAGNOSIS — M77.8 TENDINITIS OF BOTH ELBOWS: ICD-10-CM

## 2021-11-19 DIAGNOSIS — E55.9 VITAMIN D DEFICIENCY: ICD-10-CM

## 2021-11-19 DIAGNOSIS — Z12.5 SCREENING FOR MALIGNANT NEOPLASM OF PROSTATE: ICD-10-CM

## 2021-11-19 DIAGNOSIS — E78.1 HYPERTRIGLYCERIDEMIA: ICD-10-CM

## 2021-11-19 PROCEDURE — 99214 OFFICE O/P EST MOD 30 MIN: CPT | Performed by: NURSE PRACTITIONER

## 2021-11-19 PROCEDURE — 90674 CCIIV4 VAC NO PRSV 0.5 ML IM: CPT | Performed by: NURSE PRACTITIONER

## 2021-11-19 RX ORDER — AMLODIPINE BESYLATE 10 MG/1
TABLET ORAL
Qty: 90 TABLET | Refills: 6 | Status: SHIPPED | OUTPATIENT
Start: 2021-11-19 | End: 2022-01-07 | Stop reason: SDUPTHER

## 2021-11-19 RX ORDER — OMEGA-3-ACID ETHYL ESTERS 1 G/1
2 CAPSULE, LIQUID FILLED ORAL 2 TIMES DAILY WITH MEALS
Qty: 360 CAPSULE | Refills: 0 | Status: SHIPPED | OUTPATIENT
Start: 2021-11-19 | End: 2022-01-07 | Stop reason: SDUPTHER

## 2021-11-19 RX ORDER — ERGOCALCIFEROL 1.25 MG/1
50000 CAPSULE ORAL
Qty: 12 CAPSULE | Refills: 0 | Status: SHIPPED | OUTPATIENT
Start: 2021-11-19 | End: 2022-01-07 | Stop reason: SDUPTHER

## 2021-11-19 RX ORDER — DICLOFENAC SODIUM 75 MG/1
TABLET, DELAYED RELEASE ORAL
Qty: 60 TABLET | Refills: 2 | Status: SHIPPED | OUTPATIENT
Start: 2021-11-19 | End: 2022-01-07 | Stop reason: SDUPTHER

## 2021-11-19 NOTE — PROGRESS NOTES
Dyana Tse (: 1965) is a 64 y.o. male, established patient, here for evaluation of the following chief complaint(s):  Follow Up Chronic Condition, Hypertension, and Cholesterol Problem    ASSESSMENT/PLAN:  Below is the assessment and plan developed based on review of pertinent history, physical exam, labs, studies, and medications. 1. Encounter for immunization  -     INFLUENZA, INJECTABLE, MDCK, PRESERVATIVE FREE, QUADRIVALENT  2. Essential hypertension  -     CBC WITH AUTOMATED DIFF  -     METABOLIC PANEL, COMPREHENSIVE  -     LIPID PANEL  -     THYROID CASCADE PROFILE  -     MICROALBUMIN, UR, RAND W/ MICROALB/CREAT RATIO  -     amLODIPine (NORVASC) 10 mg tablet; TAKE 1 TABLET BY MOUTH DAILY, Normal, Disp-90 Tablet, R-6  3. Hypertriglyceridemia  -     CBC WITH AUTOMATED DIFF  -     METABOLIC PANEL, COMPREHENSIVE  -     LIPID PANEL  -     THYROID CASCADE PROFILE  -     omega-3 acid ethyl esters (LOVAZA) 1 gram capsule; Take 2 Capsules by mouth two (2) times daily (with meals). Indications: high amount of triglyceride in the blood, Normal, Disp-360 Capsule, R-0  4. Tendinitis of both elbows  -     diclofenac EC (VOLTAREN) 75 mg EC tablet; TAKE 1 TABLET BY MOUTH TWO TIMES A DAY, Normal, Disp-60 Tablet, R-2  5. Arthritis of knee  -     diclofenac EC (VOLTAREN) 75 mg EC tablet; TAKE 1 TABLET BY MOUTH TWO TIMES A DAY, Normal, Disp-60 Tablet, R-2  6. Vitamin D deficiency  -     ergocalciferol (ERGOCALCIFEROL) 1,250 mcg (50,000 unit) capsule; Take 1 Capsule by mouth every seven (7) days. Indications: vitamin D deficiency (high dose therapy), Normal, Disp-12 Capsule, R-0  -     VITAMIN D, 25 HYDROXY  7. Screening for malignant neoplasm of prostate  -     PSA W/ REFLX FREE PSA    Return in about 6 months (around 2022) for Hyperlipidemia, HTN, Lab review.       SUBJECTIVE/OBJECTIVE:  HPI   About 8 week ago had an episode in which he had blurry vision while at dinner, wife checked glucose and bp, reports normal but did not state levels. Yesterday driving started blurry vision of the left eye for several minutes that resolved by itself. No headache,   Does report new onset of headache for the past couple months, takes OTC NASAIDs or Tylenol and it helps, localized occipital, pressure like feeling, causes tenderness to touch, last eye exam 7 years ago was normal. No dizziness, confusion, LOC, vertigo. Have been taking BP meds as directed. Did go to the neuro and eye specialist, had eye surgery and blury vision improved. Had endoscopy 11/10/2021, Dr Donald Osborne, had diverticulosis, was told diet high in fiber. Review of Systems  All other systems reviewed and are negative. Visit Vitals  /82 (BP 1 Location: Left upper arm, BP Patient Position: Sitting, BP Cuff Size: Adult)   Pulse 78   Temp 97.5 °F (36.4 °C) (Skin)   Resp 18   Wt 214 lb 3.2 oz (97.2 kg)   SpO2 97%   BMI 32.57 kg/m²       Physical Exam  Constitutional:  No acute distress  HEENT:  Head normocephalic and atraumatic. CV:  Regular rate and rhythm. No murmur. Respiratory:  Lungs clear to auscultation bilaterally  Abdomen:  Soft, non-tender. Skin:  Normal color. Warm and Dry  Extremities:  Non-tender. No pedal edema. Back:  No tenderness  Neuro:  No gross motor deficits    On this date 11/19/2021 I have spent 36 minutes reviewing previous notes, test results and face to face with the patient discussing the diagnosis and importance of compliance with the treatment plan as well as documenting on the day of the visit. Aspects of this note may have been generated using voice recognition software. Despite editing, there may be some syntax errors. An electronic signature was used to authenticate this note.   -- Pavel Vargas NP

## 2021-11-19 NOTE — PROGRESS NOTES
Chief Complaint   Patient presents with    Follow Up Chronic Condition    Hypertension    Cholesterol Problem     Visit Vitals  /82 (BP 1 Location: Left upper arm, BP Patient Position: Sitting, BP Cuff Size: Adult)   Pulse 78   Temp 97.5 °F (36.4 °C) (Skin)   Resp 18   Wt 214 lb 3.2 oz (97.2 kg)   SpO2 97%   BMI 32.57 kg/m²

## 2021-11-21 LAB
25(OH)D3+25(OH)D2 SERPL-MCNC: 17.6 NG/ML (ref 30–100)
ALBUMIN SERPL-MCNC: 4.6 G/DL (ref 3.8–4.9)
ALBUMIN/CREAT UR: 11 MG/G CREAT (ref 0–29)
ALBUMIN/GLOB SERPL: 1.8 {RATIO} (ref 1.2–2.2)
ALP SERPL-CCNC: 120 IU/L (ref 44–121)
ALT SERPL-CCNC: 36 IU/L (ref 0–44)
AST SERPL-CCNC: 24 IU/L (ref 0–40)
BASOPHILS # BLD AUTO: 0.1 X10E3/UL (ref 0–0.2)
BASOPHILS NFR BLD AUTO: 1 %
BILIRUB SERPL-MCNC: 0.3 MG/DL (ref 0–1.2)
BUN SERPL-MCNC: 13 MG/DL (ref 6–24)
BUN/CREAT SERPL: 16 (ref 9–20)
CALCIUM SERPL-MCNC: 9.2 MG/DL (ref 8.7–10.2)
CHLORIDE SERPL-SCNC: 102 MMOL/L (ref 96–106)
CHOLEST SERPL-MCNC: 309 MG/DL (ref 100–199)
CO2 SERPL-SCNC: 23 MMOL/L (ref 20–29)
CREAT SERPL-MCNC: 0.79 MG/DL (ref 0.76–1.27)
CREAT UR-MCNC: 54.1 MG/DL
EOSINOPHIL # BLD AUTO: 0.1 X10E3/UL (ref 0–0.4)
EOSINOPHIL NFR BLD AUTO: 2 %
ERYTHROCYTE [DISTWIDTH] IN BLOOD BY AUTOMATED COUNT: 13 % (ref 11.6–15.4)
GLOBULIN SER CALC-MCNC: 2.5 G/DL (ref 1.5–4.5)
GLUCOSE SERPL-MCNC: 113 MG/DL (ref 65–99)
HCT VFR BLD AUTO: 42.4 % (ref 37.5–51)
HDLC SERPL-MCNC: 38 MG/DL
HGB BLD-MCNC: 14.7 G/DL (ref 13–17.7)
IMM GRANULOCYTES # BLD AUTO: 0 X10E3/UL (ref 0–0.1)
IMM GRANULOCYTES NFR BLD AUTO: 1 %
LDLC SERPL CALC-MCNC: 125 MG/DL (ref 0–99)
LYMPHOCYTES # BLD AUTO: 2.1 X10E3/UL (ref 0.7–3.1)
LYMPHOCYTES NFR BLD AUTO: 39 %
MCH RBC QN AUTO: 30.2 PG (ref 26.6–33)
MCHC RBC AUTO-ENTMCNC: 34.7 G/DL (ref 31.5–35.7)
MCV RBC AUTO: 87 FL (ref 79–97)
MICROALBUMIN UR-MCNC: 6.1 UG/ML
MONOCYTES # BLD AUTO: 0.5 X10E3/UL (ref 0.1–0.9)
MONOCYTES NFR BLD AUTO: 8 %
NEUTROPHILS # BLD AUTO: 2.8 X10E3/UL (ref 1.4–7)
NEUTROPHILS NFR BLD AUTO: 49 %
PLATELET # BLD AUTO: 295 X10E3/UL (ref 150–450)
POTASSIUM SERPL-SCNC: 3.9 MMOL/L (ref 3.5–5.2)
PROT SERPL-MCNC: 7.1 G/DL (ref 6–8.5)
PSA SERPL-MCNC: 1.3 NG/ML (ref 0–4)
RBC # BLD AUTO: 4.87 X10E6/UL (ref 4.14–5.8)
REFLEX CRITERIA: NORMAL
SODIUM SERPL-SCNC: 140 MMOL/L (ref 134–144)
TRIGL SERPL-MCNC: 781 MG/DL (ref 0–149)
TSH SERPL DL<=0.005 MIU/L-ACNC: 1.07 UIU/ML (ref 0.45–4.5)
VLDLC SERPL CALC-MCNC: 146 MG/DL (ref 5–40)
WBC # BLD AUTO: 5.5 X10E3/UL (ref 3.4–10.8)

## 2022-01-05 ENCOUNTER — NURSE TRIAGE (OUTPATIENT)
Dept: OTHER | Facility: CLINIC | Age: 57
End: 2022-01-05

## 2022-01-05 NOTE — TELEPHONE ENCOUNTER
Received call from  Kieran at [facility] with [employer]. Subjective: Caller states \"blood sugar yesterday 230 and this morning BS was 118 and after he ate 275, pt feels like he is floating \"     Current Symptoms: fatigue, pt just not feeling good    Onset: yesterday    Associated Symptoms: constipation 4 days ago    Pain Severity: no pain    Temperature: no temp    What has been tried: drinking a lot of water    LMP: NA Pregnant: NA    Recommended disposition: go to ER now    Care advice provided, patient verbalizes understanding; denies any other questions or concerns; instructed to call back for any new or worsening symptoms. Patient proceeding to closest Emergency Department    Attention Provider: Thank you for allowing me to participate in the care of your patient. The patient was connected to triage in response to information provided to the [facility]. Please do not respond through this encounter as the response is not directed to a shared pool.       Reason for Disposition   Blood glucose > 240 mg/dL (13.3 mmol/L) and rapid breathing    Protocols used: DIABETES - HIGH BLOOD SUGAR-ADULT-[place]

## 2022-01-07 ENCOUNTER — OFFICE VISIT (OUTPATIENT)
Dept: FAMILY MEDICINE CLINIC | Age: 57
End: 2022-01-07
Payer: COMMERCIAL

## 2022-01-07 VITALS
HEIGHT: 68 IN | SYSTOLIC BLOOD PRESSURE: 126 MMHG | RESPIRATION RATE: 20 BRPM | WEIGHT: 214 LBS | BODY MASS INDEX: 32.43 KG/M2 | HEART RATE: 66 BPM | OXYGEN SATURATION: 96 % | TEMPERATURE: 97.5 F | DIASTOLIC BLOOD PRESSURE: 71 MMHG

## 2022-01-07 DIAGNOSIS — E84.8 ELEVATED LIVER ENZYMES LEVEL DUE TO CYSTIC FIBROSIS (HCC): ICD-10-CM

## 2022-01-07 DIAGNOSIS — M17.10 ARTHRITIS OF KNEE: ICD-10-CM

## 2022-01-07 DIAGNOSIS — R74.8 ELEVATED LIVER ENZYMES LEVEL DUE TO CYSTIC FIBROSIS (HCC): ICD-10-CM

## 2022-01-07 DIAGNOSIS — M77.8 TENDINITIS OF BOTH ELBOWS: ICD-10-CM

## 2022-01-07 DIAGNOSIS — I10 ESSENTIAL HYPERTENSION: ICD-10-CM

## 2022-01-07 DIAGNOSIS — E78.1 HYPERTRIGLYCERIDEMIA: ICD-10-CM

## 2022-01-07 DIAGNOSIS — E11.9 TYPE 2 DIABETES MELLITUS WITHOUT COMPLICATION, WITHOUT LONG-TERM CURRENT USE OF INSULIN (HCC): Primary | ICD-10-CM

## 2022-01-07 DIAGNOSIS — E55.9 VITAMIN D DEFICIENCY: ICD-10-CM

## 2022-01-07 PROCEDURE — 99214 OFFICE O/P EST MOD 30 MIN: CPT | Performed by: NURSE PRACTITIONER

## 2022-01-07 RX ORDER — ERGOCALCIFEROL 1.25 MG/1
50000 CAPSULE ORAL
Qty: 12 CAPSULE | Refills: 3 | Status: SHIPPED | OUTPATIENT
Start: 2022-01-07 | End: 2022-02-01 | Stop reason: SDUPTHER

## 2022-01-07 RX ORDER — DICLOFENAC SODIUM 75 MG/1
TABLET, DELAYED RELEASE ORAL
Qty: 60 TABLET | Refills: 5 | Status: SHIPPED | OUTPATIENT
Start: 2022-01-07 | End: 2022-08-26 | Stop reason: SDUPTHER

## 2022-01-07 RX ORDER — OMEGA-3-ACID ETHYL ESTERS 1 G/1
2 CAPSULE, LIQUID FILLED ORAL 2 TIMES DAILY WITH MEALS
Qty: 360 CAPSULE | Refills: 3 | Status: SHIPPED | OUTPATIENT
Start: 2022-01-07 | End: 2022-02-01 | Stop reason: SDUPTHER

## 2022-01-07 RX ORDER — AMLODIPINE BESYLATE 10 MG/1
TABLET ORAL
Qty: 90 TABLET | Refills: 3 | Status: SHIPPED | OUTPATIENT
Start: 2022-01-07 | End: 2022-06-01 | Stop reason: SDUPTHER

## 2022-01-07 NOTE — PROGRESS NOTES
Mignon Boyd (: 1965) is a 64 y.o. male, established patient, here for evaluation of the following chief complaint(s):  High Blood Sugar         ASSESSMENT/PLAN:  Below is the assessment and plan developed based on review of pertinent history, physical exam, labs, studies, and medications. 1. Type 2 diabetes mellitus without complication, without long-term current use of insulin (HCC)  -     HEMOGLOBIN A1C WITH EAG  2. Hypertriglyceridemia  -     omega-3 acid ethyl esters (LOVAZA) 1 gram capsule; Take 2 Capsules by mouth two (2) times daily (with meals). Indications: high amount of triglyceride in the blood, Normal, Disp-360 Capsule, R-3  -     CBC WITH AUTOMATED DIFF  -     METABOLIC PANEL, COMPREHENSIVE  -     LIPID PANEL  -     THYROID CASCADE PROFILE  -     MICROALBUMIN, UR, RAND W/ MICROALB/CREAT RATIO  3. Elevated liver enzymes level due to cystic fibrosis ([address]  4. Tendinitis of both elbows  -     diclofenac EC (VOLTAREN) 75 mg EC tablet; TAKE 1 TABLET BY MOUTH TWO TIMES A DAY, Normal, Disp-60 Tablet, R-5  5. Arthritis of knee  -     diclofenac EC (VOLTAREN) 75 mg EC tablet; TAKE 1 TABLET BY MOUTH TWO TIMES A DAY, Normal, Disp-60 Tablet, R-5  6. Vitamin D deficiency  -     ergocalciferol (ERGOCALCIFEROL) 1,250 mcg (50,000 unit) capsule; Take 1 Capsule by mouth every seven (7) days. Indications: vitamin D deficiency (high dose therapy), Normal, Disp-12 Capsule, R-3  7. Essential hypertension  -     amLODIPine (NORVASC) 10 mg tablet; TAKE 1 TABLET BY MOUTH DAILY, Normal, Disp-90 Tablet, R-3  -     CBC WITH AUTOMATED DIFF  -     METABOLIC PANEL, COMPREHENSIVE  -     LIPID PANEL  -     THYROID CASCADE PROFILE  -     MICROALBUMIN, UR, RAND W/ MICROALB/CREAT RATIO    Return in about 2 weeks (around 2022) for HTN, DM? home glucose 230, Lab review, Hyperlipidemia, Vit D def. SUBJECTIVE/OBJECTIVE:  HPI  Patient presenting for hypertension followup, diabetes follow up, hyperlipidemia check. Have been feeling a little dizzy, check glucose and 230 at home. Patient reports blood pressures at home most frequently not checked. Patient has symptoms of fatigue. Patient reports good compliance with medications, no side effects from medications noted. Current treatments include diet modification, weight loss. Review of Systems  All other systems reviewed and are negative. Visit Vitals  /71 (BP 1 Location: Left upper arm)   Pulse 66   Temp 97.5 °F (36.4 °C)   Resp 20   Ht 5' 8\" (1.727 m)   Wt 214 lb (97.1 kg)   SpO2 96%   BMI 32.54 kg/m²       Physical Exam  Constitutional:  No acute distress  HEENT:  Head normocephalic and atraumatic. CV:  Regular rate and rhythm. No murmur. Respiratory:  Lungs clear to auscultation bilaterally  Abdomen:  Soft, non-tender. Skin:  Normal color. Warm and Dry  Extremities:  Non-tender. No pedal edema. Back:  No tenderness  Neuro:  No gross motor deficits    On this date 01/07/2022 I have spent 31 minutes reviewing previous notes, test results and face to face with the patient discussing the diagnosis and importance of compliance with the treatment plan as well as documenting on the day of the visit. Aspects of this note may have been generated using voice recognition software. Despite editing, there may be some syntax errors. An electronic signature was used to authenticate this note.   -- Ct Jonas NP

## 2022-01-07 NOTE — PROGRESS NOTES
Room:     Identified pt with two pt identifiers(name and ). Reviewed record in preparation for visit and have obtained necessary documentation. All patient medications has been reviewed. Chief Complaint   Patient presents with    High Blood Sugar       Health Maintenance Due   Topic    Colorectal Cancer Screening Combo     Shingrix Vaccine Age 50> (1 of 2)    COVID-19 Vaccine (3 - Booster for [employer] series)       Vitals:    22 0909   BP: 126/71   Pulse: 66   Resp: 20   Temp: 97.5 °F (36.4 °C)   SpO2: 96%   Weight: 214 lb (97.1 kg)   Height: 5' 8\" (1.727 m)   PainSc:   0 - No pain       4. Have you been to the ER, urgent care clinic since your last visit? Hospitalized since your last visit? Yes, due to flu in Dec, 2021.     5. Have you seen or consulted any other health care providers outside of the [address] [place] [address] since your last visit? Include any pap smears or colon screening. No        Patient is accompanied by self I have received verbal consent from Bianca Carcamo to discuss any/all medical information while they are present in the room.

## 2022-01-08 LAB
ALBUMIN SERPL-MCNC: 4.9 G/DL (ref 3.8–4.9)
ALBUMIN/CREAT UR: 9 MG/G CREAT (ref 0–29)
ALBUMIN/GLOB SERPL: 1.9 {RATIO} (ref 1.2–2.2)
ALP SERPL-CCNC: 96 IU/L (ref 44–121)
ALT SERPL-CCNC: 29 IU/L (ref 0–44)
AST SERPL-CCNC: 19 IU/L (ref 0–40)
BASOPHILS # BLD AUTO: 0 X10E3/UL (ref 0–0.2)
BASOPHILS NFR BLD AUTO: 1 %
BILIRUB SERPL-MCNC: 0.5 MG/DL (ref 0–1.2)
BUN SERPL-MCNC: 13 MG/DL (ref 6–24)
BUN/CREAT SERPL: 16 (ref 9–20)
CALCIUM SERPL-MCNC: 9.3 MG/DL (ref 8.7–10.2)
CHLORIDE SERPL-SCNC: 103 MMOL/L (ref 96–106)
CHOLEST SERPL-MCNC: 236 MG/DL (ref 100–199)
CO2 SERPL-SCNC: 23 MMOL/L (ref 20–29)
CREAT SERPL-MCNC: 0.8 MG/DL (ref 0.76–1.27)
CREAT UR-MCNC: 135.3 MG/DL
EOSINOPHIL # BLD AUTO: 0 X10E3/UL (ref 0–0.4)
EOSINOPHIL NFR BLD AUTO: 1 %
ERYTHROCYTE [DISTWIDTH] IN BLOOD BY AUTOMATED COUNT: 13.3 % (ref 11.6–15.4)
EST. AVERAGE GLUCOSE BLD GHB EST-MCNC: 143 MG/DL
GLOBULIN SER CALC-MCNC: 2.6 G/DL (ref 1.5–4.5)
GLUCOSE SERPL-MCNC: 111 MG/DL (ref 65–99)
HBA1C MFR BLD: 6.6 % (ref 4.8–5.6)
HCT VFR BLD AUTO: 42.3 % (ref 37.5–51)
HDLC SERPL-MCNC: 40 MG/DL
HGB BLD-MCNC: 14.6 G/DL (ref 13–17.7)
IMM GRANULOCYTES # BLD AUTO: 0 X10E3/UL (ref 0–0.1)
IMM GRANULOCYTES NFR BLD AUTO: 0 %
LDLC SERPL CALC-MCNC: 138 MG/DL (ref 0–99)
LYMPHOCYTES # BLD AUTO: 2.1 X10E3/UL (ref 0.7–3.1)
LYMPHOCYTES NFR BLD AUTO: 45 %
MCH RBC QN AUTO: 29.7 PG (ref 26.6–33)
MCHC RBC AUTO-ENTMCNC: 34.5 G/DL (ref 31.5–35.7)
MCV RBC AUTO: 86 FL (ref 79–97)
MICROALBUMIN UR-MCNC: 12.2 UG/ML
MONOCYTES # BLD AUTO: 0.4 X10E3/UL (ref 0.1–0.9)
MONOCYTES NFR BLD AUTO: 7 %
NEUTROPHILS # BLD AUTO: 2.2 X10E3/UL (ref 1.4–7)
NEUTROPHILS NFR BLD AUTO: 46 %
PLATELET # BLD AUTO: 281 X10E3/UL (ref 150–450)
POTASSIUM SERPL-SCNC: 4.4 MMOL/L (ref 3.5–5.2)
PROT SERPL-MCNC: 7.5 G/DL (ref 6–8.5)
RBC # BLD AUTO: 4.91 X10E6/UL (ref 4.14–5.8)
SODIUM SERPL-SCNC: 139 MMOL/L (ref 134–144)
TRIGL SERPL-MCNC: 318 MG/DL (ref 0–149)
TSH SERPL DL<=0.005 MIU/L-ACNC: 0.75 UIU/ML (ref 0.45–4.5)
VLDLC SERPL CALC-MCNC: 58 MG/DL (ref 5–40)
WBC # BLD AUTO: 4.7 X10E3/UL (ref 3.4–10.8)

## 2022-01-10 ENCOUNTER — TELEPHONE (OUTPATIENT)
Dept: FAMILY MEDICINE CLINIC | Age: 57
End: 2022-01-10

## 2022-01-10 NOTE — TELEPHONE ENCOUNTER
Patient wife is worried about his sugar she said it is too high and was wondering if he can be put on medication he has a visit on 2-1-2022

## 2022-02-01 ENCOUNTER — OFFICE VISIT (OUTPATIENT)
Dept: FAMILY MEDICINE CLINIC | Age: 57
End: 2022-02-01
Payer: COMMERCIAL

## 2022-02-01 VITALS
DIASTOLIC BLOOD PRESSURE: 84 MMHG | WEIGHT: 211.6 LBS | TEMPERATURE: 97.7 F | HEART RATE: 71 BPM | HEIGHT: 68 IN | BODY MASS INDEX: 32.07 KG/M2 | RESPIRATION RATE: 18 BRPM | SYSTOLIC BLOOD PRESSURE: 128 MMHG | OXYGEN SATURATION: 96 %

## 2022-02-01 DIAGNOSIS — E11.9 TYPE 2 DIABETES MELLITUS WITHOUT COMPLICATION, WITHOUT LONG-TERM CURRENT USE OF INSULIN (HCC): ICD-10-CM

## 2022-02-01 DIAGNOSIS — I10 HYPERTENSION, ESSENTIAL: ICD-10-CM

## 2022-02-01 DIAGNOSIS — Z23 ENCOUNTER FOR IMMUNIZATION: ICD-10-CM

## 2022-02-01 DIAGNOSIS — E78.5 HYPERLIPIDEMIA, UNSPECIFIED HYPERLIPIDEMIA TYPE: Primary | ICD-10-CM

## 2022-02-01 DIAGNOSIS — E55.9 VITAMIN D DEFICIENCY: ICD-10-CM

## 2022-02-01 DIAGNOSIS — E78.1 HYPERTRIGLYCERIDEMIA: ICD-10-CM

## 2022-02-01 PROCEDURE — 90732 PPSV23 VACC 2 YRS+ SUBQ/IM: CPT | Performed by: NURSE PRACTITIONER

## 2022-02-01 PROCEDURE — 99214 OFFICE O/P EST MOD 30 MIN: CPT | Performed by: NURSE PRACTITIONER

## 2022-02-01 RX ORDER — ERGOCALCIFEROL 1.25 MG/1
50000 CAPSULE ORAL
Qty: 24 CAPSULE | Refills: 3 | Status: SHIPPED | OUTPATIENT
Start: 2022-02-01

## 2022-02-01 RX ORDER — METFORMIN HYDROCHLORIDE 500 MG/1
500 TABLET ORAL 2 TIMES DAILY WITH MEALS
Qty: 180 TABLET | Refills: 3 | Status: SHIPPED | OUTPATIENT
Start: 2022-02-01 | End: 2022-06-01 | Stop reason: SDUPTHER

## 2022-02-01 RX ORDER — OMEGA-3-ACID ETHYL ESTERS 1 G/1
2 CAPSULE, LIQUID FILLED ORAL 2 TIMES DAILY WITH MEALS
Qty: 360 CAPSULE | Refills: 3 | Status: SHIPPED | OUTPATIENT
Start: 2022-02-01

## 2022-02-01 RX ORDER — BLOOD SUGAR DIAGNOSTIC
1 STRIP MISCELLANEOUS 3 TIMES DAILY
Qty: 300 STRIP | Refills: 5 | Status: SHIPPED | OUTPATIENT
Start: 2022-02-01

## 2022-02-01 RX ORDER — ZOSTER VACCINE RECOMBINANT, ADJUVANTED 50 MCG/0.5
0.5 KIT INTRAMUSCULAR ONCE
Qty: 0.5 ML | Refills: 0 | Status: SHIPPED | OUTPATIENT
Start: 2022-02-01 | End: 2022-02-01

## 2022-02-01 NOTE — PROGRESS NOTES
1. Have you been to the ER, urgent care clinic since your last visit? Hospitalized since your last visit? No    2. Have you seen or consulted any other health care providers outside of the [address] [place] [address] since your last visit? Include any pap smears or colon screening.  No      Chief Complaint   Patient presents with    Diabetes     states hes here to christinauss blood sugars     Visit Vitals  /84 (BP 1 Location: Right upper arm, BP Patient Position: Sitting, BP Cuff Size: Adult)   Pulse 71   Temp 97.7 °F (36.5 °C) (Temporal)   Resp 18   Ht 5' 8\" (1.727 m)   Wt 211 lb 9.6 oz (96 kg)   SpO2 96%   BMI 32.17 kg/m²

## 2022-02-01 NOTE — PROGRESS NOTES
Sole Keyes (: 1965) is a 64 y.o. male, established patient, here for evaluation of the following chief complaint(s):  Diabetes (states hes here to dicuss blood sugars)    ASSESSMENT/PLAN:  Below is the assessment and plan developed based on review of pertinent history, physical exam, labs, studies, and medications. 1. Hyperlipidemia, unspecified hyperlipidemia type  -     CBC WITH AUTOMATED DIFF  -     METABOLIC PANEL, COMPREHENSIVE  -     LIPID PANEL  2. Hypertension, essential  -     CBC WITH AUTOMATED DIFF  -     METABOLIC PANEL, COMPREHENSIVE  -     LIPID PANEL  3. Type 2 diabetes mellitus without complication, without long-term current use of insulin (HCC)  -     metFORMIN (GLUCOPHAGE) 500 mg tablet; Take 1 Tablet by mouth two (2) times daily (with meals). Indications: prevention of type 2 diabetes mellitus, Normal, Disp-180 Tablet, R-3  -     CBC WITH AUTOMATED DIFF  -     METABOLIC PANEL, COMPREHENSIVE  -     LIPID PANEL  -     HEMOGLOBIN A1C WITH EAG  -     glucose blood VI test strips (OneTouch Ultra Test) strip; 1 Each by Does Not Apply route three (3) times daily. , Normal, Disp-300 Strip, R-5  4. Vitamin D deficiency  -     ergocalciferol (ERGOCALCIFEROL) 1,250 mcg (50,000 unit) capsule; Take 1 Capsule by mouth every Tuesday and Friday. Indications: vitamin D deficiency (high dose therapy), Normal, Disp-24 Capsule, R-3  5. Hypertriglyceridemia  -     omega-3 acid ethyl esters (LOVAZA) 1 gram capsule; Take 2 Capsules by mouth two (2) times daily (with meals). Indications: high amount of triglyceride in the blood, Normal, Disp-360 Capsule, R-3  6. Encounter for immunization  -     varicella-zoster recombinant, PF, (Shingrix, PF,) 50 mcg/0.5 mL susr injection; 0.5 mL by IntraMUSCular route once for 1 dose., Normal, Disp-0.5 mL, R-0    For the diabetes we will start Metformin 500 mg twice a day with meals, educated on medication, uses, and side effects.   Cholesterol persistently elevated however cannot tolerate multiple modalities at this time educated on dietary changes such as diet high in fiber low in fat and exercise daily  Will increase vitamin D to be taken twice a week  We will recheck in 3 months  Return in about 3 months (around 5/1/2022) for DM, Hyperlipidemia, HTN, Lab review, new metformin 500 BID. SUBJECTIVE/OBJECTIVE:  HPI  Patient presenting for hypertension followup, diabetes follow up, medication refill, hyperlipidemia check. Patient reports blood pressures at home most frequently not checked. Glucose levels at home between 180-210. Patient has symptoms of none of the following; no chest pain, shortness of breath, weakness, orthostatic hypotension, cough, myalgias, rash, headaches, weight gain, leg swelling, palpitations, slow heart rate, fatigue, depression. Patient reports good compliance with medications, no side effects from medications noted. Current treatments include diet modification, weight loss. Review of Systems  All other systems reviewed and are negative. Visit Vitals  /84 (BP 1 Location: Right upper arm, BP Patient Position: Sitting, BP Cuff Size: Adult)   Pulse 71   Temp 97.7 °F (36.5 °C) (Temporal)   Resp 18   Ht 5' 8\" (1.727 m)   Wt 211 lb 9.6 oz (96 kg)   SpO2 96%   BMI 32.17 kg/m²       Physical Exam  Constitutional:  No acute distress  HEENT:  Head normocephalic and atraumatic. CV:  Regular rate and rhythm. No murmur. Respiratory:  Lungs clear to auscultation bilaterally  Abdomen:  Soft, non-tender. Skin:  Normal color. Warm and Dry  Extremities:  Non-tender. No pedal edema. Back:  No tenderness  Neuro:  No gross motor deficits    On this date 02/01/2022 I have spent 31 minutes reviewing previous notes, test results and face to face with the patient discussing the diagnosis and importance of compliance with the treatment plan as well as documenting on the day of the visit.     Aspects of this note may have been generated using voice recognition software. Despite editing, there may be some syntax errors. An electronic signature was used to authenticate this note.   -- Larry Stevens NP

## 2022-03-18 PROBLEM — E78.5 HYPERLIPIDEMIA: Status: ACTIVE | Noted: 2022-02-01

## 2022-03-18 PROBLEM — E78.1 HYPERTRIGLYCERIDEMIA: Status: ACTIVE | Noted: 2021-01-22

## 2022-03-19 PROBLEM — I10 HYPERTENSION, ESSENTIAL: Status: ACTIVE | Noted: 2021-01-22

## 2022-03-19 PROBLEM — N52.9 PRIMARY ERECTILE DYSFUNCTION: Status: ACTIVE | Noted: 2021-01-22

## 2022-03-19 PROBLEM — E55.9 VITAMIN D DEFICIENCY: Status: ACTIVE | Noted: 2021-01-22

## 2022-03-20 PROBLEM — R74.8 ELEVATED LIVER ENZYMES LEVEL DUE TO CYSTIC FIBROSIS (HCC): Status: ACTIVE | Noted: 2021-01-22

## 2022-03-20 PROBLEM — E84.8 ELEVATED LIVER ENZYMES LEVEL DUE TO CYSTIC FIBROSIS (HCC): Status: ACTIVE | Noted: 2021-01-22

## 2022-04-12 DIAGNOSIS — E11.65 CONTROLLED TYPE 2 DIABETES MELLITUS WITH HYPERGLYCEMIA, WITH LONG-TERM CURRENT USE OF INSULIN (HCC): Primary | ICD-10-CM

## 2022-04-12 DIAGNOSIS — Z79.4 CONTROLLED TYPE 2 DIABETES MELLITUS WITH HYPERGLYCEMIA, WITH LONG-TERM CURRENT USE OF INSULIN (HCC): Primary | ICD-10-CM

## 2022-04-12 RX ORDER — FLASH GLUCOSE SENSOR
1 KIT MISCELLANEOUS
Qty: 6 KIT | Refills: 5 | Status: SHIPPED | OUTPATIENT
Start: 2022-04-12 | End: 2022-06-03 | Stop reason: SDUPTHER

## 2022-05-31 DIAGNOSIS — I10 ESSENTIAL HYPERTENSION: ICD-10-CM

## 2022-05-31 DIAGNOSIS — E11.9 TYPE 2 DIABETES MELLITUS WITHOUT COMPLICATION, WITHOUT LONG-TERM CURRENT USE OF INSULIN (HCC): ICD-10-CM

## 2022-06-03 DIAGNOSIS — E11.65 CONTROLLED TYPE 2 DIABETES MELLITUS WITH HYPERGLYCEMIA, WITH LONG-TERM CURRENT USE OF INSULIN (HCC): ICD-10-CM

## 2022-06-03 DIAGNOSIS — Z79.4 CONTROLLED TYPE 2 DIABETES MELLITUS WITH HYPERGLYCEMIA, WITH LONG-TERM CURRENT USE OF INSULIN (HCC): ICD-10-CM

## 2022-06-03 RX ORDER — AMLODIPINE BESYLATE 10 MG/1
TABLET ORAL
Qty: 90 TABLET | Refills: 3 | Status: SHIPPED | OUTPATIENT
Start: 2022-06-03

## 2022-06-03 RX ORDER — METFORMIN HYDROCHLORIDE 500 MG/1
500 TABLET ORAL 2 TIMES DAILY WITH MEALS
Qty: 180 TABLET | Refills: 3 | Status: SHIPPED | OUTPATIENT
Start: 2022-06-03

## 2022-06-08 NOTE — TELEPHONE ENCOUNTER
Last OV: 2/1/22  Next OV: Non at this time (Advised to f/u in 3 mo and no showed appt on 5/3/22)  Last Refill: 4/12/22 (Qty 6, refills 5)  Last A1c: 1/7/22

## 2022-06-09 RX ORDER — FLASH GLUCOSE SENSOR
1 KIT MISCELLANEOUS
Qty: 6 KIT | Refills: 5 | Status: SHIPPED | OUTPATIENT
Start: 2022-06-09 | End: 2022-06-22 | Stop reason: SDUPTHER

## 2022-06-22 DIAGNOSIS — Z79.4 CONTROLLED TYPE 2 DIABETES MELLITUS WITH HYPERGLYCEMIA, WITH LONG-TERM CURRENT USE OF INSULIN (HCC): ICD-10-CM

## 2022-06-22 DIAGNOSIS — E11.65 CONTROLLED TYPE 2 DIABETES MELLITUS WITH HYPERGLYCEMIA, WITH LONG-TERM CURRENT USE OF INSULIN (HCC): ICD-10-CM

## 2022-06-24 RX ORDER — FLASH GLUCOSE SENSOR
1 KIT MISCELLANEOUS
Qty: 6 KIT | Refills: 5 | Status: SHIPPED | OUTPATIENT
Start: 2022-06-24

## 2022-08-11 ENCOUNTER — TELEPHONE (OUTPATIENT)
Dept: FAMILY MEDICINE CLINIC | Age: 57
End: 2022-08-11

## 2022-08-11 NOTE — TELEPHONE ENCOUNTER
Called patient and spoke with his wife and she was going to call back because he works out of town and she didn't know his schedule to set an appt with DONOVAN Velez

## 2022-08-26 ENCOUNTER — OFFICE VISIT (OUTPATIENT)
Dept: FAMILY MEDICINE CLINIC | Age: 57
End: 2022-08-26
Payer: COMMERCIAL

## 2022-08-26 VITALS
SYSTOLIC BLOOD PRESSURE: 124 MMHG | HEIGHT: 68 IN | BODY MASS INDEX: 32.28 KG/M2 | RESPIRATION RATE: 18 BRPM | WEIGHT: 213 LBS | TEMPERATURE: 98.6 F | OXYGEN SATURATION: 96 % | DIASTOLIC BLOOD PRESSURE: 80 MMHG | HEART RATE: 75 BPM

## 2022-08-26 DIAGNOSIS — M54.2 NECK PAIN: Primary | ICD-10-CM

## 2022-08-26 DIAGNOSIS — M17.10 ARTHRITIS OF KNEE: ICD-10-CM

## 2022-08-26 DIAGNOSIS — M77.8 TENDINITIS OF BOTH ELBOWS: ICD-10-CM

## 2022-08-26 PROCEDURE — 99213 OFFICE O/P EST LOW 20 MIN: CPT | Performed by: NURSE PRACTITIONER

## 2022-08-26 RX ORDER — DICLOFENAC SODIUM 75 MG/1
TABLET, DELAYED RELEASE ORAL
Qty: 60 TABLET | Refills: 5 | Status: SHIPPED | OUTPATIENT
Start: 2022-08-26

## 2022-08-26 RX ORDER — CYCLOBENZAPRINE HCL 5 MG
5 TABLET ORAL
Qty: 30 TABLET | Refills: 5 | Status: SHIPPED | OUTPATIENT
Start: 2022-08-26

## 2022-08-26 NOTE — PROGRESS NOTES
Chief Complaint   Patient presents with    Neck Pain    Back Pain     Visit Vitals  /80 (BP 1 Location: Left upper arm, BP Patient Position: Sitting, BP Cuff Size: Adult)   Pulse 75   Temp 98.6 °F (37 °C) (Temporal)   Resp 18   Ht 5' 8\" (1.727 m)   Wt 213 lb (96.6 kg)   SpO2 96%   BMI 32.39 kg/m²       1. \"Have you been to the ER, urgent care clinic since your last visit? Hospitalized since your last visit? \" No    2. \"Have you seen or consulted any other health care providers outside of the [address] [place] [address] since your last visit? \" No     3. For patients aged 39-70: Has the patient had a colonoscopy / FIT/ Cologuard? NO      If the patient is female:    4. For patients aged 41-77: Has the patient had a mammogram within the past 2 years? NA - based on age or sex      11. For patients aged 21-65: Has the patient had a pap smear?  NA - based on age or sex

## 2022-08-26 NOTE — PROGRESS NOTES
Oliver Crenshaw (: 1965) is a 62 y.o. male, established patient, here for evaluation of the following chief complaint(s):  Neck Pain and Back Pain    ASSESSMENT/PLAN:  Below is the assessment and plan developed based on review of pertinent history, physical exam, labs, studies, and medications. Based on today's findings we will proceed as follows:     1. Neck pain  -     cyclobenzaprine (FLEXERIL) 5 mg tablet; Take 1 Tablet by mouth nightly as needed for Muscle Spasm(s). Indications: muscle spasm, Normal, Disp-30 Tablet, R-5  2. Tendinitis of both elbows  -     diclofenac EC (VOLTAREN) 75 mg EC tablet; TAKE 1 TABLET BY MOUTH TWO TIMES A DAY, Normal, Disp-60 Tablet, R-5  3. Arthritis of knee  -     diclofenac EC (VOLTAREN) 75 mg EC tablet; TAKE 1 TABLET BY MOUTH TWO TIMES A DAY, Normal, Disp-60 Tablet, R-5      Educated on medication, uses, side effects, as well as signs and symptoms that may merit immediate medical attention. Patient is not to share medication and use only as indicated. Patient verbalized understanding and agreement. If symptoms dont improve or worsen call the clinic or go to urgent in the 2-3 days for further evaluation    Return in about 3 months (around 2022) for DM, keto diet, HTN, Hyperlipidemia, neck pain. .      SUBJECTIVE/OBJECTIVE:  HPI  Startes 2 weeks ago with hx of trauma over 20 years ago, localized to the head and neck, pulls with turning head, improves with nsaid but persistent, no known new injuries. Doing keto diet has lost 20 lbs, glucose , high only when eating carbs, continues with medication as directed. Review of Systems  All other systems reviewed and are negative.   Visit Vitals  /80 (BP 1 Location: Left upper arm, BP Patient Position: Sitting, BP Cuff Size: Adult)   Pulse 75   Temp 98.6 °F (37 °C) (Temporal)   Resp 18   Ht 5' 8\" (1.727 m)   Wt 213 lb (96.6 kg)   SpO2 96%   BMI 32.39 kg/m²       Physical Exam  Constitutional:  No acute distress  HEENT:  Head normocephalic and atraumatic. CV:  Regular rate and rhythm. No murmur. Respiratory:  Lungs clear to auscultation bilaterally  Abdomen:  Soft, non-tender. Skin:  Normal color. Warm and Dry  Extremities:  Non-tender. No pedal edema. Back:  No tenderness  Neuro:  No gross motor deficits    On this date 08/26/2022 I have spent 23 minutes reviewing previous notes, test results and face to face with the patient discussing the diagnosis and importance of compliance with the treatment plan as well as documenting on the day of the visit. Aspects of this note may have been generated using voice recognition software. Despite editing, there may be some syntax errors. An electronic signature was used to authenticate this note.   -- Lan Stone NP

## 2022-08-27 LAB
ALBUMIN SERPL-MCNC: 4.4 G/DL (ref 3.8–4.9)
ALBUMIN/GLOB SERPL: 1.5 {RATIO} (ref 1.2–2.2)
ALP SERPL-CCNC: 119 IU/L (ref 44–121)
ALT SERPL-CCNC: 27 IU/L (ref 0–44)
AST SERPL-CCNC: 19 IU/L (ref 0–40)
BASOPHILS # BLD AUTO: 0.1 X10E3/UL (ref 0–0.2)
BASOPHILS NFR BLD AUTO: 1 %
BILIRUB SERPL-MCNC: 0.4 MG/DL (ref 0–1.2)
BUN SERPL-MCNC: 17 MG/DL (ref 6–24)
BUN/CREAT SERPL: 20 (ref 9–20)
CALCIUM SERPL-MCNC: 9.1 MG/DL (ref 8.7–10.2)
CHLORIDE SERPL-SCNC: 105 MMOL/L (ref 96–106)
CHOLEST SERPL-MCNC: 247 MG/DL (ref 100–199)
CO2 SERPL-SCNC: 21 MMOL/L (ref 20–29)
CREAT SERPL-MCNC: 0.83 MG/DL (ref 0.76–1.27)
EGFR: 102 ML/MIN/1.73
EOSINOPHIL # BLD AUTO: 0.1 X10E3/UL (ref 0–0.4)
EOSINOPHIL NFR BLD AUTO: 2 %
ERYTHROCYTE [DISTWIDTH] IN BLOOD BY AUTOMATED COUNT: 13.6 % (ref 11.6–15.4)
EST. AVERAGE GLUCOSE BLD GHB EST-MCNC: 120 MG/DL
GLOBULIN SER CALC-MCNC: 2.9 G/DL (ref 1.5–4.5)
GLUCOSE SERPL-MCNC: 108 MG/DL (ref 65–99)
HBA1C MFR BLD: 5.8 % (ref 4.8–5.6)
HCT VFR BLD AUTO: 42.6 % (ref 37.5–51)
HDLC SERPL-MCNC: 47 MG/DL
HGB BLD-MCNC: 14.3 G/DL (ref 13–17.7)
IMM GRANULOCYTES # BLD AUTO: 0 X10E3/UL (ref 0–0.1)
IMM GRANULOCYTES NFR BLD AUTO: 0 %
LDLC SERPL CALC-MCNC: 158 MG/DL (ref 0–99)
LYMPHOCYTES # BLD AUTO: 1.6 X10E3/UL (ref 0.7–3.1)
LYMPHOCYTES NFR BLD AUTO: 33 %
MCH RBC QN AUTO: 29.5 PG (ref 26.6–33)
MCHC RBC AUTO-ENTMCNC: 33.6 G/DL (ref 31.5–35.7)
MCV RBC AUTO: 88 FL (ref 79–97)
MONOCYTES # BLD AUTO: 0.4 X10E3/UL (ref 0.1–0.9)
MONOCYTES NFR BLD AUTO: 8 %
NEUTROPHILS # BLD AUTO: 2.7 X10E3/UL (ref 1.4–7)
NEUTROPHILS NFR BLD AUTO: 56 %
PLATELET # BLD AUTO: 305 X10E3/UL (ref 150–450)
POTASSIUM SERPL-SCNC: 4.5 MMOL/L (ref 3.5–5.2)
PROT SERPL-MCNC: 7.3 G/DL (ref 6–8.5)
RBC # BLD AUTO: 4.84 X10E6/UL (ref 4.14–5.8)
SODIUM SERPL-SCNC: 141 MMOL/L (ref 134–144)
TRIGL SERPL-MCNC: 226 MG/DL (ref 0–149)
VLDLC SERPL CALC-MCNC: 42 MG/DL (ref 5–40)
WBC # BLD AUTO: 4.8 X10E3/UL (ref 3.4–10.8)

## 2022-11-29 ENCOUNTER — OFFICE VISIT (OUTPATIENT)
Dept: FAMILY MEDICINE CLINIC | Age: 57
End: 2022-11-29
Payer: MEDICAID

## 2022-11-29 VITALS
RESPIRATION RATE: 18 BRPM | HEART RATE: 78 BPM | SYSTOLIC BLOOD PRESSURE: 141 MMHG | HEIGHT: 68 IN | TEMPERATURE: 97.5 F | WEIGHT: 218 LBS | OXYGEN SATURATION: 98 % | DIASTOLIC BLOOD PRESSURE: 91 MMHG | BODY MASS INDEX: 33.04 KG/M2

## 2022-11-29 DIAGNOSIS — I10 ESSENTIAL HYPERTENSION: ICD-10-CM

## 2022-11-29 DIAGNOSIS — Z23 ENCOUNTER FOR IMMUNIZATION: Primary | ICD-10-CM

## 2022-11-29 DIAGNOSIS — E11.9 TYPE 2 DIABETES MELLITUS WITHOUT COMPLICATION, WITHOUT LONG-TERM CURRENT USE OF INSULIN (HCC): ICD-10-CM

## 2022-11-29 DIAGNOSIS — N40.0 BENIGN PROSTATIC HYPERPLASIA WITHOUT LOWER URINARY TRACT SYMPTOMS: ICD-10-CM

## 2022-11-29 DIAGNOSIS — E78.5 HYPERLIPIDEMIA, UNSPECIFIED HYPERLIPIDEMIA TYPE: ICD-10-CM

## 2022-11-29 RX ORDER — TADALAFIL 20 MG/1
20 TABLET ORAL
Qty: 90 TABLET | Refills: 3 | Status: SHIPPED | OUTPATIENT
Start: 2022-11-29

## 2022-11-29 NOTE — PROGRESS NOTES
Kendell Martinez (: 1965) is a 62 y.o. male, established patient, here for evaluation of the following chief complaint(s):  Follow-up, Hypertension, and Cholesterol Problem    ASSESSMENT/PLAN:  Below is the assessment and plan developed based on review of pertinent history, physical exam, labs, studies, and medications. Based on today's findings we will proceed as follows:     1. Encounter for immunization  -     INFLUENZA, FLUARIX, FLULAVAL, FLUZONE (AGE 6 MO+), AFLURIA(AGE 3Y+) IM, PF, 0.5 ML  2. Benign prostatic hyperplasia without lower urinary tract symptoms  -     tadalafiL (Cialis) 20 mg tablet; Take 1 Tablet by mouth daily as needed for Erectile Dysfunction. Indications: enlarged prostate with urination problem, Normal, Disp-90 Tablet, R-3  3. Hyperlipidemia, unspecified hyperlipidemia type  -     CBC WITH AUTOMATED DIFF  -     METABOLIC PANEL, COMPREHENSIVE  -     LIPID PANEL  -     THYROID CASCADE PROFILE  4. Essential hypertension  -     CBC WITH AUTOMATED DIFF  -     METABOLIC PANEL, COMPREHENSIVE  -     LIPID PANEL  -     THYROID CASCADE PROFILE  -     MICROALBUMIN, UR, RAND W/ MICROALB/CREAT RATIO  5. Type 2 diabetes mellitus without complication, without long-term current use of insulin (HCC)  -     CBC WITH AUTOMATED DIFF  -     METABOLIC PANEL, COMPREHENSIVE  -     LIPID PANEL  -     THYROID CASCADE PROFILE  -     MICROALBUMIN, UR, RAND W/ MICROALB/CREAT RATIO  -     HEMOGLOBIN A1C WITH EAG      Educated on medication, uses, side effects, as well as signs and symptoms that may merit immediate medical attention. Patient is not to share medication and use only as indicated. Patient verbalized understanding and agreement. If symptoms dont improve or worsen call the clinic or go to urgent in the 2-3 days for further evaluation    Return in about 6 months (around 2023) for HTN, Hyperlipidemia, Lab review.       SUBJECTIVE/OBJECTIVE:  HPI    Patient is a 62 y.o. M Patient presenting for hypertension, hyperlipidemia, BPH, DM followup. started Lovaza but started to have body aches like with all previous cholesterol meds. Patient reports blood pressures at home most frequently normal. Patient has symptoms of none of the following; no chest pain, shortness of breath, weakness, orthostatic hypotension, cough, myalgias, rash, headaches, weight gain, leg swelling, palpitations, slow heart rate, fatigue, depression. Patient reports good compliance with medications, no side effects from medications noted. Current treatments include diet modification, weight loss. Review of Systems  All other systems reviewed and are negative. Visit Vitals  BP (!) 141/91 (BP 1 Location: Left upper arm, BP Patient Position: Sitting, BP Cuff Size: Large adult)   Pulse 78   Temp 97.5 °F (36.4 °C) (Temporal)   Resp 18   Ht 5' 8\" (1.727 m)   Wt 218 lb (98.9 kg)   SpO2 98%   BMI 33.15 kg/m²       Physical Exam  Constitutional:  No acute distress  HEENT:  Head normocephalic and atraumatic. CV:  Regular rate and rhythm. No murmur. Respiratory:  Lungs clear to auscultation bilaterally  Abdomen:  Soft, non-tender. Skin:  Normal color. Warm and Dry  Extremities:  Non-tender. No pedal edema. Back:  No tenderness  Neuro:  No gross motor deficits    On this date 11/29/2022 I have spent 31 minutes reviewing previous notes, test results and face to face with the patient discussing the diagnosis and importance of compliance with the treatment plan as well as documenting on the day of the visit. Aspects of this note may have been generated using voice recognition software. Despite editing, there may be some syntax errors. An electronic signature was used to authenticate this note.   -- Kimberly Madera NP

## 2022-11-29 NOTE — PROGRESS NOTES
1. Have you been to the ER, urgent care clinic since your last visit? Hospitalized since your last visit? No    2. Have you seen or consulted any other health care providers outside of the [address] [place] [address] since your last visit? Include any pap smears or colon screening.  No    Chief Complaint   Patient presents with    Follow-up    Hypertension    Cholesterol Problem     Visit Vitals  BP (!) 141/91 (BP 1 Location: Left upper arm, BP Patient Position: Sitting, BP Cuff Size: Large adult)   Pulse 78   Temp 97.5 °F (36.4 °C) (Temporal)   Resp 18   Ht 5' 8\" (1.727 m)   Wt 218 lb (98.9 kg)   SpO2 98%   BMI 33.15 kg/m²

## 2022-11-29 NOTE — PATIENT INSTRUCTIONS
Los siguientes son suplementos de venta vick que pueden ayudar a controlar mejor el colesterol:  Bianca Saha 136  niacina  Fibra soluble  Julissa Jemma

## 2022-12-01 LAB
ALBUMIN SERPL-MCNC: 4.7 G/DL (ref 3.8–4.9)
ALBUMIN/CREAT UR: 10 MG/G CREAT (ref 0–29)
ALBUMIN/GLOB SERPL: 1.7 {RATIO} (ref 1.2–2.2)
ALP SERPL-CCNC: 112 IU/L (ref 44–121)
ALT SERPL-CCNC: 27 IU/L (ref 0–44)
AST SERPL-CCNC: 22 IU/L (ref 0–40)
BASOPHILS # BLD AUTO: 0.1 X10E3/UL (ref 0–0.2)
BASOPHILS NFR BLD AUTO: 1 %
BILIRUB SERPL-MCNC: 0.4 MG/DL (ref 0–1.2)
BUN SERPL-MCNC: 14 MG/DL (ref 6–24)
BUN/CREAT SERPL: 18 (ref 9–20)
CALCIUM SERPL-MCNC: 9 MG/DL (ref 8.7–10.2)
CHLORIDE SERPL-SCNC: 101 MMOL/L (ref 96–106)
CHOLEST SERPL-MCNC: 266 MG/DL (ref 100–199)
CO2 SERPL-SCNC: 22 MMOL/L (ref 20–29)
CREAT SERPL-MCNC: 0.78 MG/DL (ref 0.76–1.27)
CREAT UR-MCNC: 88.4 MG/DL
EGFR: 104 ML/MIN/1.73
EOSINOPHIL # BLD AUTO: 0.1 X10E3/UL (ref 0–0.4)
EOSINOPHIL NFR BLD AUTO: 2 %
ERYTHROCYTE [DISTWIDTH] IN BLOOD BY AUTOMATED COUNT: 13.8 % (ref 11.6–15.4)
EST. AVERAGE GLUCOSE BLD GHB EST-MCNC: 131 MG/DL
GLOBULIN SER CALC-MCNC: 2.7 G/DL (ref 1.5–4.5)
GLUCOSE SERPL-MCNC: 102 MG/DL (ref 70–99)
HBA1C MFR BLD: 6.2 % (ref 4.8–5.6)
HCT VFR BLD AUTO: 41.6 % (ref 37.5–51)
HDLC SERPL-MCNC: 44 MG/DL
HGB BLD-MCNC: 14.5 G/DL (ref 13–17.7)
IMM GRANULOCYTES # BLD AUTO: 0 X10E3/UL (ref 0–0.1)
IMM GRANULOCYTES NFR BLD AUTO: 0 %
LDLC SERPL CALC-MCNC: 154 MG/DL (ref 0–99)
LYMPHOCYTES # BLD AUTO: 1.7 X10E3/UL (ref 0.7–3.1)
LYMPHOCYTES NFR BLD AUTO: 35 %
MCH RBC QN AUTO: 29.5 PG (ref 26.6–33)
MCHC RBC AUTO-ENTMCNC: 34.9 G/DL (ref 31.5–35.7)
MCV RBC AUTO: 85 FL (ref 79–97)
MICROALBUMIN UR-MCNC: 9 UG/ML
MONOCYTES # BLD AUTO: 0.5 X10E3/UL (ref 0.1–0.9)
MONOCYTES NFR BLD AUTO: 10 %
NEUTROPHILS # BLD AUTO: 2.6 X10E3/UL (ref 1.4–7)
NEUTROPHILS NFR BLD AUTO: 52 %
PLATELET # BLD AUTO: 310 X10E3/UL (ref 150–450)
POTASSIUM SERPL-SCNC: 4.1 MMOL/L (ref 3.5–5.2)
PROT SERPL-MCNC: 7.4 G/DL (ref 6–8.5)
RBC # BLD AUTO: 4.92 X10E6/UL (ref 4.14–5.8)
SODIUM SERPL-SCNC: 140 MMOL/L (ref 134–144)
TRIGL SERPL-MCNC: 365 MG/DL (ref 0–149)
TSH SERPL DL<=0.005 MIU/L-ACNC: 0.74 UIU/ML (ref 0.45–4.5)
VLDLC SERPL CALC-MCNC: 68 MG/DL (ref 5–40)
WBC # BLD AUTO: 5 X10E3/UL (ref 3.4–10.8)

## 2022-12-13 ENCOUNTER — TELEPHONE (OUTPATIENT)
Dept: FAMILY MEDICINE CLINIC | Age: 57
End: 2022-12-13

## 2022-12-13 NOTE — TELEPHONE ENCOUNTER
I spoke with the patient's wife and advised of lab results       ----- Message from Ana Lilia Thompson NP sent at 12/13/2022  7:56 AM EST -----  All results have greatly improved from previous. We will not make any changes to current regiment. Continue with current medication as directed and follow-up as previously scheduled or sooner as needed.

## 2023-03-13 ENCOUNTER — TELEPHONE (OUTPATIENT)
Dept: FAMILY MEDICINE CLINIC | Age: 58
End: 2023-03-13

## 2023-03-13 ENCOUNTER — NURSE TRIAGE (OUTPATIENT)
Dept: OTHER | Facility: CLINIC | Age: 58
End: 2023-03-13

## 2023-03-13 NOTE — TELEPHONE ENCOUNTER
[facility]    Received call from [facility] at [facility] with [facility]. Subjective: Caller states \"I am having pain in my neck and back of my head. It hurts all of the time\"     Current Symptoms: HA, stiff neck    Onset: 1 week      Pain Severity: 8/10; aching; constant    Temperature: denies     What has been tried: NA    Recommended disposition: Go to ED Now- patient is refusing ED and requesting jenaro with PCP    Care advice provided, patient verbalizes understanding; denies any other questions or concerns; instructed to call back for any new or worsening symptoms. Writer provided warm transfer to [place] at [facility] for ED refusal    Attention Provider: Thank you for allowing me to participate in the care of your patient. The patient was connected to triage in response to information provided to the ECC. Please do not respond through this encounter as the response is not directed to a shared pool.       Reason for Disposition   Stiff neck (can't touch chin to chest)    Protocols used: Headache-ADULT-AH

## 2023-03-13 NOTE — TELEPHONE ENCOUNTER
[facility],[address] Nurse Triage Tiffanie Adam stated she advised for patient to go to ER as the disposition due to stiff neck. Patient for a week now has had bad headaches. Nurse Triage stated patient did not want to go to er wanted to speak to office. Spoke to wife and  stated there were no appointments. Wife stated nurse was trying to get them in. Explained no available appointments today and would have the clinical staff call them back at [phone].

## 2023-03-13 NOTE — TELEPHONE ENCOUNTER
I spoke with the patients wife and also advised that we had no available appointments and that it would be best if he seek treatment at a hospital or urgent care center. Wife stated understanding .

## 2023-06-01 DIAGNOSIS — E11.9 TYPE 2 DIABETES MELLITUS WITHOUT COMPLICATIONS (HCC): ICD-10-CM

## 2023-06-01 DIAGNOSIS — I10 ESSENTIAL (PRIMARY) HYPERTENSION: ICD-10-CM

## 2023-06-01 RX ORDER — AMLODIPINE BESYLATE 10 MG/1
TABLET ORAL
Qty: 90 TABLET | Refills: 3 | Status: SHIPPED | OUTPATIENT
Start: 2023-06-01

## 2023-06-01 NOTE — TELEPHONE ENCOUNTER
Requested Prescriptions     Pending Prescriptions Disp Refills    metFORMIN (GLUCOPHAGE) 500 MG tablet [Pharmacy Med Name: METFORMIN  MG TABLET] 180 tablet 3     Sig: TAKE 1 TABLET BY MOUTH TWICE A DAY WITH FOOD    amLODIPine (NORVASC) 10 MG tablet [Pharmacy Med Name: AMLODIPINE BESYLATE 10 MG TAB] 90 tablet 3     Sig: TAKE 1 TABLET BY MOUTH EVERY DAY     Last OV:11/29/2022  Next OV:none  Last Refill:06/03/2022  Last (labs):11/29/2022    -*Insert any notes here*